# Patient Record
Sex: FEMALE | Race: WHITE | Employment: OTHER | URBAN - METROPOLITAN AREA
[De-identification: names, ages, dates, MRNs, and addresses within clinical notes are randomized per-mention and may not be internally consistent; named-entity substitution may affect disease eponyms.]

---

## 2023-05-30 ENCOUNTER — OFFICE VISIT (OUTPATIENT)
Dept: ORTHOPEDIC SURGERY | Age: 88
End: 2023-05-30
Payer: MEDICARE

## 2023-05-30 VITALS — BODY MASS INDEX: 18.77 KG/M2 | HEIGHT: 62 IN | WEIGHT: 102 LBS

## 2023-05-30 DIAGNOSIS — M51.36 DDD (DEGENERATIVE DISC DISEASE), LUMBAR: ICD-10-CM

## 2023-05-30 DIAGNOSIS — Z98.890 HISTORY OF LUMBOSACRAL SPINE SURGERY: Primary | ICD-10-CM

## 2023-05-30 DIAGNOSIS — M47.816 LUMBAR SPONDYLOSIS: ICD-10-CM

## 2023-05-30 PROCEDURE — 99204 OFFICE O/P NEW MOD 45 MIN: CPT | Performed by: PHYSICIAN ASSISTANT

## 2023-05-30 PROCEDURE — 1123F ACP DISCUSS/DSCN MKR DOCD: CPT | Performed by: PHYSICIAN ASSISTANT

## 2023-05-30 RX ORDER — CARVEDILOL 6.25 MG/1
6.25 TABLET ORAL 2 TIMES DAILY
COMMUNITY
Start: 2023-05-20

## 2023-05-30 RX ORDER — ACETAMINOPHEN 500 MG
500 TABLET ORAL 4 TIMES DAILY
COMMUNITY

## 2023-05-30 RX ORDER — B-COMPLEX WITH VITAMIN C
1 TABLET ORAL 2 TIMES DAILY WITH MEALS
COMMUNITY

## 2023-05-30 RX ORDER — AMLODIPINE BESYLATE 5 MG/1
5 TABLET ORAL DAILY
COMMUNITY
Start: 2023-03-24

## 2023-05-30 RX ORDER — LOSARTAN POTASSIUM 100 MG/1
100 TABLET ORAL DAILY
COMMUNITY
Start: 2023-03-24

## 2023-05-30 RX ORDER — ALENDRONATE SODIUM 70 MG/1
70 TABLET ORAL WEEKLY
COMMUNITY
Start: 2023-03-11

## 2023-05-30 RX ORDER — LEVOTHYROXINE SODIUM 112 MCG
112 TABLET ORAL DAILY
COMMUNITY
Start: 2023-04-24

## 2023-05-30 RX ORDER — ASPIRIN 81 MG/1
81 TABLET, CHEWABLE ORAL DAILY
COMMUNITY
Start: 2019-01-29

## 2023-05-30 RX ORDER — ATORVASTATIN CALCIUM 40 MG/1
40 TABLET, FILM COATED ORAL
COMMUNITY
Start: 2019-01-28

## 2023-05-30 RX ORDER — MELOXICAM 7.5 MG/1
7.5 TABLET ORAL 2 TIMES DAILY PRN
COMMUNITY
Start: 2023-05-06

## 2023-05-30 NOTE — PROGRESS NOTES
Name: Edvin Strickland  YOB: 1934  Gender: female  MRN: [id number]    CC:   Chief Complaint   Patient presents with    New Patient     Low back pain         HPI:   Edvin Strickland is a 80 y.o. female with a PMHx of lumbar spine grafting from her hip, scoliosis, chronic LBP, previous treated by lumbar Facet injections in 2019. They present here for evaluation of bilateral low back pain. She has a chronic history of this for several years. Her symptoms got worse 3 or 4 weeks ago. 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 She denies any numbness or tingling. She denies any radiating pain down the buttocks thighs or lower legs. Her pain is worse with standing and walking. She is using a wheelchair today to get the building due to the distance. She has been taking Mobic and Tylenol for more than 6 weeks. She is also been undergoing home exercise program for the last 6 weeks without improvement in her symptoms. Past Medical History Includes: No past medical history on file., No past surgical history on file. Family History: No family history on file.    Social History:   Social History     Tobacco Use    Smoking status: Not on file    Smokeless tobacco: Not on file   Substance Use Topics    Alcohol use: Not on file       ALLERGIES:   Allergies   Allergen Reactions    Meclizine      Disoriented        Patient Medications    Current Outpatient Medications   Medication Sig Dispense Refill    aspirin 81 MG chewable tablet Take 1 tablet by mouth daily      atorvastatin (LIPITOR) 40 MG tablet Take 1 tablet by mouth      acetaminophen (TYLENOL) 500 MG tablet Take 1 tablet by mouth 4 times daily      alendronate (FOSAMAX) 70 MG tablet Take 1 tablet by mouth once a week      amLODIPine (NORVASC) 5 MG tablet Take 1 tablet by mouth daily      Calcium

## 2023-06-18 ENCOUNTER — PATIENT MESSAGE (OUTPATIENT)
Dept: ORTHOPEDIC SURGERY | Age: 88
End: 2023-06-18

## 2023-06-18 DIAGNOSIS — M47.816 LUMBAR SPONDYLOSIS: ICD-10-CM

## 2023-06-18 DIAGNOSIS — M54.9 UPPER BACK PAIN: ICD-10-CM

## 2023-06-18 DIAGNOSIS — M54.16 LUMBAR RADICULOPATHY: Primary | ICD-10-CM

## 2023-06-18 DIAGNOSIS — M51.36 DDD (DEGENERATIVE DISC DISEASE), LUMBAR: ICD-10-CM

## 2023-06-18 DIAGNOSIS — Z98.890 HISTORY OF LUMBOSACRAL SPINE SURGERY: ICD-10-CM

## 2023-06-22 NOTE — TELEPHONE ENCOUNTER
From: Juventino Aguilar  To: Idalmis Vences  Sent: 6/18/2023 7:47 PM EDT  Subject: My mom's pain and inability to walk without assistance    Hi Tatum Caruso,  This is Gabby Golden, I was with my mom and dad at her appointment last Tuesday. Please let me know if anyone in your group thinks they can help her. I feel like I am seeing her fade right before my eyes. Even with her chronic back pain, she was very active until this acute situation started last month. Now she can barely walk, even with assistance. She is not eating well, due to the pain, and is getting weaker and weaker. She has seen [address] [place] [address] in the distant past for Facet blocks. Would a consultation with them be appropriate or helpful at this point? Thank you for your time and help. I am really scared that this is getting to the point that she won't be able to recover from this, if something deosn't improve soon.     Chacorta Baeza  [phone]